# Patient Record
Sex: FEMALE | Race: WHITE | NOT HISPANIC OR LATINO | URBAN - METROPOLITAN AREA
[De-identification: names, ages, dates, MRNs, and addresses within clinical notes are randomized per-mention and may not be internally consistent; named-entity substitution may affect disease eponyms.]

---

## 2017-07-22 ENCOUNTER — OFFICE VISIT - RIVER FALLS (OUTPATIENT)
Dept: FAMILY MEDICINE | Facility: CLINIC | Age: 47
End: 2017-07-22

## 2017-12-04 ENCOUNTER — OFFICE VISIT - RIVER FALLS (OUTPATIENT)
Dept: FAMILY MEDICINE | Facility: CLINIC | Age: 47
End: 2017-12-04

## 2017-12-04 ASSESSMENT — MIFFLIN-ST. JEOR: SCORE: 1324.84

## 2017-12-18 ENCOUNTER — OFFICE VISIT - RIVER FALLS (OUTPATIENT)
Dept: FAMILY MEDICINE | Facility: CLINIC | Age: 47
End: 2017-12-18

## 2018-06-05 ENCOUNTER — OFFICE VISIT - RIVER FALLS (OUTPATIENT)
Dept: FAMILY MEDICINE | Facility: CLINIC | Age: 48
End: 2018-06-05

## 2018-08-07 ENCOUNTER — OFFICE VISIT - RIVER FALLS (OUTPATIENT)
Dept: FAMILY MEDICINE | Facility: CLINIC | Age: 48
End: 2018-08-07

## 2019-02-12 ENCOUNTER — OFFICE VISIT - RIVER FALLS (OUTPATIENT)
Dept: FAMILY MEDICINE | Facility: CLINIC | Age: 49
End: 2019-02-12

## 2019-05-28 ENCOUNTER — OFFICE VISIT - RIVER FALLS (OUTPATIENT)
Dept: FAMILY MEDICINE | Facility: CLINIC | Age: 49
End: 2019-05-28

## 2019-11-12 ENCOUNTER — OFFICE VISIT - RIVER FALLS (OUTPATIENT)
Dept: FAMILY MEDICINE | Facility: CLINIC | Age: 49
End: 2019-11-12

## 2019-11-14 LAB
BUN SERPL-MCNC: 16 MG/DL (ref 7–25)
BUN/CREAT RATIO - HISTORICAL: NORMAL (ref 6–22)
CALCIUM SERPL-MCNC: 9.3 MG/DL (ref 8.6–10.4)
CHLORIDE BLD-SCNC: 106 MMOL/L (ref 98–110)
CO2 SERPL-SCNC: 26 MMOL/L (ref 20–32)
CREAT SERPL-MCNC: 0.77 MG/DL (ref 0.5–1.05)
EGFRCR SERPLBLD CKD-EPI 2021: 90 ML/MIN/1.73M2
GLUCOSE BLD-MCNC: 92 MG/DL (ref 65–99)
HGB BLD-MCNC: 12 GM/DL (ref 11.7–15.5)
POTASSIUM BLD-SCNC: 4.4 MMOL/L (ref 3.5–5.3)
SODIUM SERPL-SCNC: 138 MMOL/L (ref 135–146)

## 2020-01-30 ENCOUNTER — OFFICE VISIT - RIVER FALLS (OUTPATIENT)
Dept: FAMILY MEDICINE | Facility: CLINIC | Age: 50
End: 2020-01-30

## 2020-02-05 ENCOUNTER — OFFICE VISIT - HEALTHEAST (OUTPATIENT)
Dept: OTOLARYNGOLOGY | Facility: CLINIC | Age: 50
End: 2020-02-05

## 2020-02-05 DIAGNOSIS — R04.0 EPISTAXIS: ICD-10-CM

## 2020-02-05 RX ORDER — AMOXICILLIN 875 MG
875 TABLET ORAL 2 TIMES DAILY
Status: SHIPPED | COMMUNITY
Start: 2020-02-05
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
## 2022-02-12 VITALS
OXYGEN SATURATION: 97 % | SYSTOLIC BLOOD PRESSURE: 138 MMHG | WEIGHT: 163.8 LBS | HEART RATE: 75 BPM | TEMPERATURE: 98.6 F | BODY MASS INDEX: 29.02 KG/M2 | DIASTOLIC BLOOD PRESSURE: 98 MMHG

## 2022-02-12 VITALS
BODY MASS INDEX: 28.63 KG/M2 | SYSTOLIC BLOOD PRESSURE: 120 MMHG | HEART RATE: 65 BPM | TEMPERATURE: 97.7 F | WEIGHT: 161.6 LBS | BODY MASS INDEX: 28.41 KG/M2 | DIASTOLIC BLOOD PRESSURE: 90 MMHG | TEMPERATURE: 97.8 F | HEART RATE: 64 BPM | SYSTOLIC BLOOD PRESSURE: 132 MMHG | WEIGHT: 160.4 LBS | DIASTOLIC BLOOD PRESSURE: 84 MMHG

## 2022-02-12 VITALS
WEIGHT: 161.4 LBS | TEMPERATURE: 97.5 F | BODY MASS INDEX: 28.59 KG/M2 | DIASTOLIC BLOOD PRESSURE: 89 MMHG | HEART RATE: 70 BPM | SYSTOLIC BLOOD PRESSURE: 138 MMHG

## 2022-02-12 VITALS
SYSTOLIC BLOOD PRESSURE: 120 MMHG | WEIGHT: 164.4 LBS | DIASTOLIC BLOOD PRESSURE: 80 MMHG | HEART RATE: 68 BPM | HEART RATE: 66 BPM | DIASTOLIC BLOOD PRESSURE: 82 MMHG | TEMPERATURE: 97.4 F | BODY MASS INDEX: 29.13 KG/M2 | OXYGEN SATURATION: 99 % | HEIGHT: 63 IN | SYSTOLIC BLOOD PRESSURE: 142 MMHG

## 2022-02-12 VITALS
SYSTOLIC BLOOD PRESSURE: 134 MMHG | WEIGHT: 166 LBS | HEART RATE: 68 BPM | DIASTOLIC BLOOD PRESSURE: 84 MMHG | TEMPERATURE: 98.2 F | BODY MASS INDEX: 29.41 KG/M2

## 2022-02-12 VITALS
SYSTOLIC BLOOD PRESSURE: 116 MMHG | HEART RATE: 78 BPM | DIASTOLIC BLOOD PRESSURE: 72 MMHG | OXYGEN SATURATION: 96 % | BODY MASS INDEX: 28.98 KG/M2 | WEIGHT: 163.6 LBS | TEMPERATURE: 98.5 F

## 2022-02-12 VITALS — DIASTOLIC BLOOD PRESSURE: 99 MMHG | HEART RATE: 82 BPM | SYSTOLIC BLOOD PRESSURE: 147 MMHG

## 2022-02-16 NOTE — NURSING NOTE
Comprehensive Intake Entered On:  11/12/2019 5:51 PM CST    Performed On:  11/12/2019 5:46 PM CST by Radha Duran CMA               Summary   Chief Complaint :   DOS: 11/25/19  Left TKA with Dr. Le at .    Menstrual Status :   Menarcheal   Weight Measured :   166 lb(Converted to: 166 lb 0 oz, 75.30 kg)    Systolic Blood Pressure :   134 mmHg (HI)    Diastolic Blood Pressure :   84 mmHg (HI)    Mean Arterial Pressure :   101 mmHg   Peripheral Pulse Rate :   68 bpm   BP Site :   Right arm   Pulse Site :   Radial artery   BP Method :   Manual   HR Method :   Manual   Temperature Tympanic :   98.2 DegF(Converted to: 36.8 DegC)    Radha Duran CMA - 11/12/2019 5:46 PM CST   Health Status   Allergies Verified? :   Yes   Medication History Verified? :   Yes   Pre-Visit Planning Status :   Completed   Radha Duran CMA - 11/12/2019 5:46 PM CST   Meds / Allergies   (As Of: 11/12/2019 5:51:58 PM CST)   Allergies (Active)   No Known Medication Allergies  Estimated Onset Date:   Unspecified ; Created By:   Kaylen Fulton; Reaction Status:   Active ; Category:   Drug ; Substance:   No Known Medication Allergies ; Type:   Allergy ; Updated By:   Kaylen Fulton; Reviewed Date:   2/12/2019 6:09 PM CST        Medication List   (As Of: 11/12/2019 5:51:58 PM CST)   No Known Home Medications     Radha Duran CMA - 11/12/2019 5:50:15 PM
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
## 2022-02-16 NOTE — PROGRESS NOTES
Patient:   TERESSA QUIROGA            MRN: [id number]            FIN: [id number]               Age:   47 years     Sex:  Female     :  1970   Associated Diagnoses:   Effusion of knee   Author:   Ramiro Del Cid MD      Subjective   Chief complaint 2017 11:42 AM CDT   Pt here today c/o of left knee pain and swelling.  .     she has had this happen several times and takes NSAIDS with improvment  no fever or chills, no trauma  no change in medications      Health Status   Allergies:    Allergic Reactions (Selected)  No known allergies   Medications:  (Selected)   Prescriptions  Prescribed  ketoprofen 75 mg oral capsule: 1 cap(s) ( 75 mg ), PO, TID, PRN: for arthritis, # 30 cap(s), 0 Refill(s), Type: Maintenance, Pharmacy: [id number], 1 cap(s) po tid,PRN:for arthritis   Problem list:    All Problems (Selected)  Tobacco abuse / 541033321 / Confirmed  Obese / 278.00 / Probable  Lateral Epicondylitis/Tennis Elbow / 726.32 / Confirmed  Tobacco user / 305.1 / Probable      Objective   Vital Signs   2017 11:42 AM CDT Temperature Tympanic 98.6 DegF    Peripheral Pulse Rate 75 bpm    HR Method Electronic    Systolic Blood Pressure 138 mmHg    Diastolic Blood Pressure 98 mmHg  HI    Mean Arterial Pressure 111 mmHg    BP Site Right arm    BP Method Manual    Oxygen Saturation 97 %      Measurements from flowsheet : Measurements   2017 11:42 AM CDT   Weight Measured - Standard                163.8 lb     knee is swollen but full flexion and extension  no redness or local tenderness      Impression and Plan   Assessment and Plan:          Diagnosis: Effusion of knee (SYO99-YQ M25.469).         Course: offerred knee injection or prednsione  she will take nsaid  ice  brace  watch for fever or worsening reddness.

## 2022-02-16 NOTE — NURSING NOTE
Comprehensive Intake Entered On:  1/30/2020 7:45 PM CST    Performed On:  1/30/2020 7:44 PM CST by Kadie Brown MA               Summary   Chief Complaint :   here with nose bleeds since last night on/off.  had 3 episodes today, worse this evening.  did have knee surgery in November.   Menstrual Status :   Menarcheal   Systolic Blood Pressure :   147 mmHg (HI)    Diastolic Blood Pressure :   99 mmHg (HI)    Mean Arterial Pressure :   115 mmHg   Peripheral Pulse Rate :   82 bpm   BP Site :   Right arm   Pulse Site :   Radial artery   BP Method :   Electronic   HR Method :   Electronic   Kadie Brown MA - 1/30/2020 7:44 PM CST   Health Status   Allergies Verified? :   Yes   Medication History Verified? :   Yes   Medical History Verified? :   Yes   Pre-Visit Planning Status :   Not completed   Tobacco Use? :   Current every day smoker   Kadie Brown MA - 1/30/2020 7:44 PM CST   Consents   Consent for Immunization Exchange :   Consent Granted   Consent for Immunizations to Providers :   Consent Granted   Kadie Brown MA - 1/30/2020 7:44 PM CST   Meds / Allergies   (As Of: 1/30/2020 7:45:56 PM CST)   Allergies (Active)   No Known Medication Allergies  Estimated Onset Date:   Unspecified ; Created By:   Kaylen Fulton; Reaction Status:   Active ; Category:   Drug ; Substance:   No Known Medication Allergies ; Type:   Allergy ; Updated By:   Kaylen Fulton; Reviewed Date:   2/12/2019 6:09 PM CST        Medication List   (As Of: 1/30/2020 7:45:56 PM CST)   No Known Home Medications     Kadie Brown MA - 1/30/2020 7:44:17 PM           Social History   Social History   (As Of: 1/30/2020 7:45:56 PM CST)   Alcohol:        Current, 1-2 times per week   Comments:  12/10/2013 3:05 PM - Donita Mc MA: One drink   (Last Updated: 12/10/2013 3:05:50 PM CST by Donita Mc MA)          Tobacco:        Current, Cigarettes   Comments:  7/10/2015 9:10 AM - Jocelyn Hernandez: 10 per day, 20 years.   (Last  Updated: 7/10/2015 9:10:13 AM CDT by Jocelyn Hernandez)          Substance Abuse:        Never   (Last Updated: 12/10/2013 3:06:14 PM CST by Donita Mc MA)          Employment/School:        Employed, Work/School description: clerical work.   (Last Updated: 7/10/2015 9:09:11 AM CDT by Jocelyn Hernandez)          Home/Environment:        Marital status: .  Spouse/Partner name: Salinas Pandey.  2 children.   (Last Updated: 12/10/2013 3:07:15 PM CST by Donita Mc MA)          Nutrition/Health:        Type of diet: Regular.   (Last Updated: 12/10/2013 3:07:28 PM CST by Donita Mc MA)          Exercise:        Exercise frequency: 3 times/week.  Exercise duration: 20.   (Last Updated: 12/10/2013 3:07:55 PM CST by Donita Mc MA)          Sexual:        Sexually active: Yes.  Sexual orientation: Heterosexual.  Contraceptive Use Details: tubes tied and vasectomy.   (Last Updated: 12/10/2013 3:08:32 PM CST by Donita Mc MA)
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
## 2022-02-16 NOTE — PROGRESS NOTES
Patient:   TERESSA QUIROGA            MRN: [id number]            FIN: [id number]               Age:   47 years     Sex:  Female     :  1970   Associated Diagnoses:   None   Author:   Sky Pagan MD      Visit Information      Date of Service: 2017 05:31 pm  Performing Location: [facility]  Encounter#: [id number]      Primary Care Provider (PCP):  Johnathan Nogueira MD    NPI# [id number]      Referring Provider:  Sky Pagan MD    NPI# [id number]      Chief Complaint   2017 5:35 PM CST   Pt c/o R ear pain since this afternoon. Cold sx prior.        History of Present Illness   r ear pain x 1 day  cold symptoms x 2 days  hx of ear infections      Review of Systems         no difficulty breathing      Physical Examination   Vital Signs   2017 5:35 PM CST Temperature Tympanic 97.4 DegF  LOW    Peripheral Pulse Rate 68 bpm    Pulse Site Radial artery    HR Method Manual    Systolic Blood Pressure 132 mmHg  HI    Diastolic Blood Pressure 86 mmHg  HI    Mean Arterial Pressure 101 mmHg    BP Site Right arm    BP Method Manual      Measurements from flowsheet : Measurements   2017 5:35 PM CST   Ht/Wt Measurement Refused by Patient?     Yes       appears well  r tm bulging and erythematous  left tm normal  nose with inflamation b/l  oropahyrnx normal  neck no adenopathy  lungs clear
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
